# Patient Record
Sex: FEMALE | Race: WHITE | Employment: FULL TIME | URBAN - NONMETROPOLITAN AREA
[De-identification: names, ages, dates, MRNs, and addresses within clinical notes are randomized per-mention and may not be internally consistent; named-entity substitution may affect disease eponyms.]

---

## 2024-05-14 RX ORDER — LISINOPRIL 10 MG/1
10 TABLET ORAL DAILY
Qty: 30 TABLET | OUTPATIENT
Start: 2024-05-14

## 2024-05-22 ENCOUNTER — OFFICE VISIT (OUTPATIENT)
Dept: FAMILY MEDICINE CLINIC | Age: 44
End: 2024-05-22

## 2024-05-22 VITALS
BODY MASS INDEX: 36.19 KG/M2 | HEIGHT: 64 IN | DIASTOLIC BLOOD PRESSURE: 82 MMHG | WEIGHT: 212 LBS | HEART RATE: 89 BPM | OXYGEN SATURATION: 98 % | SYSTOLIC BLOOD PRESSURE: 122 MMHG

## 2024-05-22 DIAGNOSIS — R01.1 CARDIAC MURMUR: ICD-10-CM

## 2024-05-22 DIAGNOSIS — R01.1 NEWLY RECOGNIZED HEART MURMUR: ICD-10-CM

## 2024-05-22 DIAGNOSIS — Z13.6 ENCOUNTER FOR LIPID SCREENING FOR CARDIOVASCULAR DISEASE: ICD-10-CM

## 2024-05-22 DIAGNOSIS — I10 PRIMARY HYPERTENSION: ICD-10-CM

## 2024-05-22 DIAGNOSIS — Z13.220 ENCOUNTER FOR LIPID SCREENING FOR CARDIOVASCULAR DISEASE: ICD-10-CM

## 2024-05-22 DIAGNOSIS — R60.0 LOWER LEG EDEMA: Primary | ICD-10-CM

## 2024-05-22 DIAGNOSIS — R53.83 OTHER FATIGUE: ICD-10-CM

## 2024-05-22 DIAGNOSIS — G43.C0 PERIODIC HEADACHE SYNDROME, NOT INTRACTABLE: ICD-10-CM

## 2024-05-22 DIAGNOSIS — M54.42 ACUTE LEFT-SIDED LOW BACK PAIN WITH LEFT-SIDED SCIATICA: ICD-10-CM

## 2024-05-22 PROCEDURE — 99212 OFFICE O/P EST SF 10 MIN: CPT

## 2024-05-22 PROCEDURE — 3074F SYST BP LT 130 MM HG: CPT

## 2024-05-22 PROCEDURE — 93010 ELECTROCARDIOGRAM REPORT: CPT

## 2024-05-22 PROCEDURE — 93005 ELECTROCARDIOGRAM TRACING: CPT

## 2024-05-22 PROCEDURE — 3079F DIAST BP 80-89 MM HG: CPT

## 2024-05-22 PROCEDURE — 99204 OFFICE O/P NEW MOD 45 MIN: CPT

## 2024-05-22 RX ORDER — POTASSIUM CHLORIDE 750 MG/1
10 TABLET, FILM COATED, EXTENDED RELEASE ORAL DAILY
Qty: 60 TABLET | Refills: 0 | Status: SHIPPED | OUTPATIENT
Start: 2024-05-22

## 2024-05-22 RX ORDER — TOPIRAMATE 50 MG/1
TABLET, FILM COATED ORAL
COMMUNITY
Start: 2024-04-12 | End: 2024-05-22

## 2024-05-22 RX ORDER — TIZANIDINE 2 MG/1
2 TABLET ORAL EVERY 8 HOURS PRN
Qty: 40 TABLET | Refills: 0 | Status: SHIPPED | OUTPATIENT
Start: 2024-05-22

## 2024-05-22 RX ORDER — LISINOPRIL 10 MG/1
10 TABLET ORAL DAILY
COMMUNITY
Start: 2024-04-12 | End: 2024-05-22 | Stop reason: SDUPTHER

## 2024-05-22 RX ORDER — FUROSEMIDE 20 MG/1
TABLET ORAL
COMMUNITY
Start: 2024-04-12 | End: 2024-05-22 | Stop reason: SDUPTHER

## 2024-05-22 RX ORDER — TOPIRAMATE 100 MG/1
100 TABLET, FILM COATED ORAL 2 TIMES DAILY
Qty: 60 TABLET | Refills: 0 | Status: SHIPPED | OUTPATIENT
Start: 2024-05-22

## 2024-05-22 RX ORDER — POTASSIUM CHLORIDE 750 MG/1
TABLET, FILM COATED, EXTENDED RELEASE ORAL
COMMUNITY
Start: 2024-04-12 | End: 2024-05-22 | Stop reason: SDUPTHER

## 2024-05-22 RX ORDER — FUROSEMIDE 20 MG/1
20 TABLET ORAL DAILY
Qty: 60 TABLET | Refills: 0 | Status: SHIPPED | OUTPATIENT
Start: 2024-05-22

## 2024-05-22 RX ORDER — LISINOPRIL 10 MG/1
10 TABLET ORAL DAILY
Qty: 30 TABLET | Refills: 0 | Status: SHIPPED | OUTPATIENT
Start: 2024-05-22

## 2024-05-22 SDOH — ECONOMIC STABILITY: FOOD INSECURITY: WITHIN THE PAST 12 MONTHS, THE FOOD YOU BOUGHT JUST DIDN'T LAST AND YOU DIDN'T HAVE MONEY TO GET MORE.: NEVER TRUE

## 2024-05-22 SDOH — ECONOMIC STABILITY: FOOD INSECURITY: WITHIN THE PAST 12 MONTHS, YOU WORRIED THAT YOUR FOOD WOULD RUN OUT BEFORE YOU GOT MONEY TO BUY MORE.: NEVER TRUE

## 2024-05-22 SDOH — ECONOMIC STABILITY: HOUSING INSECURITY
IN THE LAST 12 MONTHS, WAS THERE A TIME WHEN YOU DID NOT HAVE A STEADY PLACE TO SLEEP OR SLEPT IN A SHELTER (INCLUDING NOW)?: NO

## 2024-05-22 SDOH — ECONOMIC STABILITY: INCOME INSECURITY: HOW HARD IS IT FOR YOU TO PAY FOR THE VERY BASICS LIKE FOOD, HOUSING, MEDICAL CARE, AND HEATING?: NOT HARD AT ALL

## 2024-05-22 ASSESSMENT — ENCOUNTER SYMPTOMS
SHORTNESS OF BREATH: 0
CHEST TIGHTNESS: 0
WHEEZING: 0
ABDOMINAL PAIN: 0
COLOR CHANGE: 0
BACK PAIN: 1
COUGH: 0

## 2024-05-22 ASSESSMENT — PATIENT HEALTH QUESTIONNAIRE - PHQ9
SUM OF ALL RESPONSES TO PHQ QUESTIONS 1-9: 0
SUM OF ALL RESPONSES TO PHQ QUESTIONS 1-9: 0
SUM OF ALL RESPONSES TO PHQ9 QUESTIONS 1 & 2: 0
SUM OF ALL RESPONSES TO PHQ QUESTIONS 1-9: 0
SUM OF ALL RESPONSES TO PHQ QUESTIONS 1-9: 0
2. FEELING DOWN, DEPRESSED OR HOPELESS: NOT AT ALL
1. LITTLE INTEREST OR PLEASURE IN DOING THINGS: NOT AT ALL

## 2024-05-22 NOTE — PROGRESS NOTES
[facility]  [address] [address]  Dept: [phone]    Date of Service:  5/22/2024    Jess Gonzalez is a 44 y.o. female who presents in office today with Self.    Chief Complaint   Patient presents with    New Patient     Pt is here to get established. Pt states she is having a lot of edema in both legs and feet. Pt states she was put on lasix and that seems to not be helping.         Diagnoses / Plan:   1. Lower leg edema  -     furosemide (LASIX) 20 MG tablet; Take 1 tablet by mouth daily, Disp-60 tablet, R-0Normal  -     potassium chloride (KLOR-CON) 10 MEQ extended release tablet; Take 1 tablet by mouth daily, Disp-60 tablet, R-0Normal  -Did have some dependency edema exacerbation due to being seated for extended times yesterday.  Will restart on Lasix as well as potassium, check CMP, office will call with results.  Will have her follow-up with cardiology to explore if this is linked to a heart problem as she is not taking any NSAIDs or other medications that would cause this.  Weight is possibly contributing factor and this is discussed with Kate.  Do suggest compression stockings on in the morning off in the evenings.  2. Primary hypertension  Assessment & Plan:   At goal, continue current medications and continue current treatment plan doing well on current medications, she had been on Lopressor for palpitations however denies this currently.  Orders:  -     lisinopril (PRINIVIL;ZESTRIL) 10 MG tablet; Take 1 tablet by mouth daily, Disp-30 tablet, R-0Normal  -     Comprehensive Metabolic Panel, Fasting; Future  -     CBC with Auto Differential; Future  -     Jonah Cote MD, Cardiology, [place]  -     EKG 12 lead; Future  3. Periodic headache syndrome, not intractable  -     topiramate (TOPAMAX) 100 MG tablet; Take 1 tablet by mouth 2 times daily, Disp-60 tablet, R-0Normal  -Controlled: Does have history of migraines, has not had migraine in quite some

## 2024-05-22 NOTE — ASSESSMENT & PLAN NOTE
At goal, continue current medications and continue current treatment plan doing well on current medications, she had been on Lopressor for palpitations however denies this currently.

## 2024-05-23 LAB
ALBUMIN/GLOBULIN RATIO: 1.5 G/DL
ALBUMIN: 4.1 G/DL (ref 3.5–5)
ALP BLD-CCNC: 81 UNITS/L (ref 38–126)
ALT SERPL-CCNC: 21 UNITS/L (ref 4–35)
ANION GAP SERPL CALCULATED.3IONS-SCNC: 6.5 MMOL/L (ref 3–11)
AST SERPL-CCNC: 22 UNITS/L (ref 14–36)
BASOPHILS ABSOLUTE: 0.06 (ref 0–0.3)
BASOPHILS RELATIVE PERCENT: 0.71 (ref 0–3)
BILIRUB SERPL-MCNC: 0.5 MG/DL (ref 0.2–1.3)
BUN BLDV-MCNC: 11 MG/DL (ref 7–17)
CALCIUM SERPL-MCNC: 9.2 MG/DL (ref 8.4–10.2)
CHLORIDE BLD-SCNC: 108 MMOL/L (ref 98–120)
CHOLESTEROL, TOTAL: 158 MG/DL (ref 50–200)
CHOLESTEROL/HDL RATIO: 2 RATIO (ref 0–4.5)
CO2: 22 MMOL/L (ref 22–31)
CREAT SERPL-MCNC: 0.5 MG/DL (ref 0.5–1)
EOSINOPHILS ABSOLUTE: 0.33 (ref 0–1.1)
EOSINOPHILS RELATIVE PERCENT: 3.65 (ref 0–10)
GFR, ESTIMATED: > 60
GLOBULIN: 2.7 G/DL
GLUCOSE: 86 MG/DL (ref 65–105)
HCT VFR BLD CALC: 41.1 % (ref 37–47)
HDLC SERPL-MCNC: 63 MG/DL (ref 36–68)
HEMOGLOBIN: 12.9 (ref 12–16)
LDL CHOLESTEROL: 84 MG/DL (ref 0–160)
LYMPHOCYTES ABSOLUTE: 2.93 (ref 1–5.5)
LYMPHOCYTES RELATIVE PERCENT: 32.78 (ref 20–51.1)
MCH RBC QN AUTO: 27.4 PG (ref 28.5–32.5)
MCHC RBC AUTO-ENTMCNC: 31.3 G/DL (ref 32–37)
MCV RBC AUTO: 87.6 FL (ref 80–94)
MONOCYTES ABSOLUTE: 0.58 (ref 0.1–1)
MONOCYTES RELATIVE PERCENT: 6.48 (ref 1.7–9.3)
NEUTROPHILS ABSOLUTE: 5.03 (ref 2–8.1)
NEUTROPHILS RELATIVE PERCENT: 56.37 (ref 42.2–75.2)
PDW BLD-RTO: 12.8 % (ref 8.5–15.5)
PLATELET # BLD: 264.5 THOU/MM3 (ref 130–400)
POTASSIUM SERPL-SCNC: 4 MMOL/L (ref 3.6–5)
RBC # BLD: 4.69 M/UL (ref 4.2–5.4)
SODIUM BLD-SCNC: 137 MMOL/L (ref 135–145)
TOTAL PROTEIN: 6.9 G/DL (ref 6.3–8.2)
TRIGL SERPL-MCNC: 55 MG/DL (ref 10–250)
TSH REFLEX FT4: 2.23 MIU/ML (ref 0.49–4.67)
VLDLC SERPL CALC-MCNC: 11 MG/DL (ref 0–50)
WBC # BLD: 8.9 THOU/ML3 (ref 4.8–10.8)

## 2024-06-12 DIAGNOSIS — M54.42 ACUTE LEFT-SIDED LOW BACK PAIN WITH LEFT-SIDED SCIATICA: ICD-10-CM

## 2024-06-12 NOTE — TELEPHONE ENCOUNTER
Jess Gonzalez is requesting a refill on the following medication(s):  Requested Prescriptions     Pending Prescriptions Disp Refills    tiZANidine (ZANAFLEX) 2 MG tablet [Pharmacy Med Name: TIZANIDINE HCL 2 MG TABLET] 40 tablet 0     Sig: take 1 tablet by mouth every 8 hours if needed for back pain       Last Visit Date (If Applicable):  5/22/2024    Next Visit Date:    Visit date not found

## 2024-06-13 RX ORDER — TIZANIDINE 2 MG/1
TABLET ORAL
Qty: 40 TABLET | Refills: 0 | Status: SHIPPED | OUTPATIENT
Start: 2024-06-13

## 2024-06-14 ENCOUNTER — OFFICE VISIT (OUTPATIENT)
Dept: CARDIOLOGY | Age: 44
End: 2024-06-14

## 2024-06-14 VITALS
OXYGEN SATURATION: 99 % | HEIGHT: 64 IN | HEART RATE: 75 BPM | BODY MASS INDEX: 35.75 KG/M2 | WEIGHT: 209.4 LBS | SYSTOLIC BLOOD PRESSURE: 138 MMHG | DIASTOLIC BLOOD PRESSURE: 66 MMHG

## 2024-06-14 DIAGNOSIS — I10 ESSENTIAL HYPERTENSION: ICD-10-CM

## 2024-06-14 DIAGNOSIS — I50.32 CHRONIC DIASTOLIC HEART FAILURE (HCC): ICD-10-CM

## 2024-06-14 DIAGNOSIS — R01.1 SYSTOLIC MURMUR: Primary | ICD-10-CM

## 2024-06-14 DIAGNOSIS — R60.0 LOWER EXTREMITY EDEMA: ICD-10-CM

## 2024-06-14 PROCEDURE — 3078F DIAST BP <80 MM HG: CPT | Performed by: INTERNAL MEDICINE

## 2024-06-14 PROCEDURE — 99204 OFFICE O/P NEW MOD 45 MIN: CPT | Performed by: INTERNAL MEDICINE

## 2024-06-14 PROCEDURE — 3075F SYST BP GE 130 - 139MM HG: CPT | Performed by: INTERNAL MEDICINE

## 2024-06-14 NOTE — PROGRESS NOTES
Cardiology Consultation/Follow Up.  [facility]    Jess Gonzalez  1980  [id number]    Today: 24    CC: Patient is here for new consult for murmur    HPI:   Jess Gonzalez is here for new consult for murmur.  States that she has a history of this.   Saw PCP, heard murmur, referred to cardiology.   No cp.   No sob.   No syncope.   Gets some LE edema occasionally.   On chronic lasix and KCL.   No edema today.   Some occasional palpitations.     Past Medical:  Past Medical History:   Diagnosis Date    Asthma 2014    Chest pain 2014    Fever 2014    Hypertension 2014    Nausea 2014    Palpitations 2014         Past Surgical:  Past Surgical History:   Procedure Laterality Date    BACK SURGERY       SECTION      CHOLECYSTECTOMY      HYSTERECTOMY (CERVIX STATUS UNKNOWN)  2013    LUMBAR SPINE SURGERY N/A 2016    decompressiom L4-5    TONSILLECTOMY           Family History:  History reviewed. No pertinent family history.    Social History:  Social History     Socioeconomic History    Marital status: Single     Spouse name: Not on file    Number of children: Not on file    Years of education: Not on file    Highest education level: Not on file   Occupational History    Not on file   Tobacco Use    Smoking status: Never    Smokeless tobacco: Never   Substance and Sexual Activity    Alcohol use: No    Drug use: No    Sexual activity: Not on file   Other Topics Concern    Not on file   Social History Narrative    Not on file     Social Determinants of Health     Financial Resource Strain: Low Risk  (2024)    Overall Financial Resource Strain (CARDIA)     Difficulty of Paying Living Expenses: Not hard at all   Food Insecurity: No Food Insecurity (2024)    Hunger Vital Sign     Worried About Running Out of Food in the Last Year: Never true     Ran Out of Food in the Last Year: Never true   Transportation Needs: Unknown (2024)    PRAPARE -

## 2024-06-21 ENCOUNTER — HOSPITAL ENCOUNTER (OUTPATIENT)
Age: 44
Discharge: HOME | End: 2024-06-21
Attending: INTERNAL MEDICINE

## 2024-06-21 ENCOUNTER — TELEPHONE (OUTPATIENT)
Dept: CARDIOLOGY | Age: 44
End: 2024-06-21

## 2024-06-21 VITALS — WEIGHT: 207.23 LBS | BODY MASS INDEX: 34.53 KG/M2 | HEIGHT: 65 IN

## 2024-06-21 DIAGNOSIS — R60.0 LOWER EXTREMITY EDEMA: ICD-10-CM

## 2024-06-21 DIAGNOSIS — I50.32 CHRONIC DIASTOLIC HEART FAILURE (HCC): ICD-10-CM

## 2024-06-21 DIAGNOSIS — R01.1 SYSTOLIC MURMUR: ICD-10-CM

## 2024-06-21 DIAGNOSIS — I10 ESSENTIAL HYPERTENSION: ICD-10-CM

## 2024-06-21 LAB
ECHO AO ROOT DIAM: 2.7 CM
ECHO AO ROOT INDEX: 1.34 CM/M2
ECHO AV AREA PEAK VELOCITY: 2.4 CM2
ECHO AV AREA/BSA PEAK VELOCITY: 1.2 CM2/M2
ECHO AV PEAK GRADIENT: 10 MMHG
ECHO AV PEAK VELOCITY: 1.6 M/S
ECHO AV VELOCITY RATIO: 0.81
ECHO BSA: 2.08 M2
ECHO EST RA PRESSURE: 3 MMHG
ECHO IVC PROX: 1.7 CM
ECHO LA AREA 2C: 19.8 CM2
ECHO LA AREA 4C: 17.8 CM2
ECHO LA DIAMETER INDEX: 1.54 CM/M2
ECHO LA DIAMETER: 3.1 CM
ECHO LA MAJOR AXIS: 5.2 CM
ECHO LA MINOR AXIS: 5.5 CM
ECHO LA TO AORTIC ROOT RATIO: 1.15
ECHO LA VOL BP: 53 ML (ref 22–52)
ECHO LA VOL MOD A2C: 57 ML (ref 22–52)
ECHO LA VOL MOD A4C: 46 ML (ref 22–52)
ECHO LA VOL/BSA BIPLANE: 26 ML/M2 (ref 16–34)
ECHO LA VOLUME INDEX MOD A2C: 28 ML/M2 (ref 16–34)
ECHO LA VOLUME INDEX MOD A4C: 23 ML/M2 (ref 16–34)
ECHO LV E' LATERAL VELOCITY: 11 CM/S
ECHO LV E' SEPTAL VELOCITY: 8 CM/S
ECHO LV EDV A2C: 101 ML
ECHO LV EDV A4C: 79 ML
ECHO LV EDV INDEX A4C: 39 ML/M2
ECHO LV EDV NDEX A2C: 50 ML/M2
ECHO LV EJECTION FRACTION A2C: 62 %
ECHO LV EJECTION FRACTION A4C: 60 %
ECHO LV EJECTION FRACTION BIPLANE: 60 % (ref 55–100)
ECHO LV ESV A2C: 38 ML
ECHO LV ESV A4C: 32 ML
ECHO LV ESV INDEX A2C: 19 ML/M2
ECHO LV ESV INDEX A4C: 16 ML/M2
ECHO LV FRACTIONAL SHORTENING: 42 % (ref 28–44)
ECHO LV INTERNAL DIMENSION DIASTOLE INDEX: 2.39 CM/M2
ECHO LV INTERNAL DIMENSION DIASTOLIC: 4.8 CM (ref 3.9–5.3)
ECHO LV INTERNAL DIMENSION SYSTOLIC INDEX: 1.39 CM/M2
ECHO LV INTERNAL DIMENSION SYSTOLIC: 2.8 CM
ECHO LV IVSD: 1.1 CM (ref 0.6–0.9)
ECHO LV MASS 2D: 181.9 G (ref 67–162)
ECHO LV MASS INDEX 2D: 90.5 G/M2 (ref 43–95)
ECHO LV POSTERIOR WALL DIASTOLIC: 1 CM (ref 0.6–0.9)
ECHO LV RELATIVE WALL THICKNESS RATIO: 0.42
ECHO LVOT AREA: 2.8 CM2
ECHO LVOT DIAM: 1.9 CM
ECHO LVOT MEAN GRADIENT: 4 MMHG
ECHO LVOT PEAK GRADIENT: 7 MMHG
ECHO LVOT PEAK VELOCITY: 1.3 M/S
ECHO LVOT STROKE VOLUME INDEX: 43.8 ML/M2
ECHO LVOT SV: 88.1 ML
ECHO LVOT VTI: 31.1 CM
ECHO MV A VELOCITY: 0.69 M/S
ECHO MV E DECELERATION TIME (DT): 161 MS
ECHO MV E VELOCITY: 0.86 M/S
ECHO MV E/A RATIO: 1.25
ECHO MV E/E' LATERAL: 7.82
ECHO MV E/E' RATIO (AVERAGED): 9.28
ECHO MV E/E' SEPTAL: 10.75
ECHO RIGHT VENTRICULAR SYSTOLIC PRESSURE (RVSP): 22 MMHG
ECHO RV TAPSE: 2.5 CM (ref 1.7–?)
ECHO TV REGURGITANT MAX VELOCITY: 2.19 M/S
ECHO TV REGURGITANT PEAK GRADIENT: 19 MMHG

## 2024-06-21 PROCEDURE — 93306 TTE W/DOPPLER COMPLETE: CPT

## 2024-06-21 PROCEDURE — 93306 TTE W/DOPPLER COMPLETE: CPT | Performed by: INTERNAL MEDICINE

## 2024-06-21 NOTE — TELEPHONE ENCOUNTER
Interpretation Summary         Left Ventricle: Normal left ventricular systolic function with a visually estimated EF of 55 - 60%. Left ventricle size is normal. Normal wall thickness. Normal wall motion. Normal diastolic function.    No significant valvular regurgitation or stenosis seen    Image quality is adequate.     Echo Findings    Left Ventricle Normal left ventricular systolic function with a visually estimated EF of 55 - 60%. Left ventricle size is normal. Normal wall thickness. Normal wall motion. Normal diastolic function.   Left Atrium Left atrium size is normal.   Right Ventricle Right ventricle size is normal. Normal systolic function.   Right Atrium Right atrium size is normal.   Aortic Valve Valve structure is normal. No regurgitation. No stenosis.   Mitral Valve Valve structure is normal. Trace regurgitation. No stenosis noted.   Tricuspid Valve Valve structure is normal. Trace-mild regurgitation. No stenosis noted. Normal RVSP.   Pulmonic Valve The pulmonic valve was not well visualized. Valve structure is normal. No regurgitation. No stenosis noted.   Aorta Normal sized aortic root and ascending aorta.   IVC/Hepatic Veins IVC diameter is less than or equal to 21 mm and decreases greater than 50% during inspiration; therefore the estimated right atrial pressure is normal (~3 mmHg). IVC size is normal.   Pericardium No pericardial effusion.

## 2024-06-26 DIAGNOSIS — G43.C0 PERIODIC HEADACHE SYNDROME, NOT INTRACTABLE: ICD-10-CM

## 2024-06-26 RX ORDER — TOPIRAMATE 100 MG/1
100 TABLET, FILM COATED ORAL 2 TIMES DAILY
Qty: 60 TABLET | Refills: 0 | OUTPATIENT
Start: 2024-06-26

## 2024-06-26 NOTE — TELEPHONE ENCOUNTER
Please refill medication since PCP is out of the office until 7/1/24. Thank you       Jess Gonzalez is requesting a refill on the following medication(s):  Requested Prescriptions     Pending Prescriptions Disp Refills    topiramate (TOPAMAX) 100 MG tablet [Pharmacy Med Name: TOPIRAMATE 100 MG TABLET] 60 tablet 0     Sig: take 1 tablet by mouth twice a day       Last Visit Date (If Applicable):  5/22/2024    Next Visit Date:    Visit date not found

## 2024-07-22 ENCOUNTER — OFFICE VISIT (OUTPATIENT)
Dept: FAMILY MEDICINE CLINIC | Age: 44
End: 2024-07-22

## 2024-07-22 VITALS
WEIGHT: 212 LBS | BODY MASS INDEX: 35.32 KG/M2 | SYSTOLIC BLOOD PRESSURE: 142 MMHG | DIASTOLIC BLOOD PRESSURE: 82 MMHG | OXYGEN SATURATION: 98 % | HEART RATE: 72 BPM

## 2024-07-22 DIAGNOSIS — Z12.31 SCREENING MAMMOGRAM FOR BREAST CANCER: Primary | ICD-10-CM

## 2024-07-22 DIAGNOSIS — M25.531 RIGHT WRIST PAIN: ICD-10-CM

## 2024-07-22 PROCEDURE — 3079F DIAST BP 80-89 MM HG: CPT

## 2024-07-22 PROCEDURE — 3077F SYST BP >= 140 MM HG: CPT

## 2024-07-22 PROCEDURE — PBSHW PBB SHADOW CHARGE

## 2024-07-22 PROCEDURE — 99213 OFFICE O/P EST LOW 20 MIN: CPT

## 2024-07-22 PROCEDURE — 99212 OFFICE O/P EST SF 10 MIN: CPT

## 2024-07-22 RX ORDER — KETOROLAC TROMETHAMINE 15 MG/ML
15 INJECTION, SOLUTION INTRAMUSCULAR; INTRAVENOUS ONCE
Status: COMPLETED | OUTPATIENT
Start: 2024-07-22 | End: 2024-07-22

## 2024-07-22 RX ADMIN — KETOROLAC TROMETHAMINE 15 MG: 15 INJECTION, SOLUTION INTRAMUSCULAR; INTRAVENOUS at 15:39

## 2024-07-22 NOTE — PROGRESS NOTES
[facility]  [address] [address]  Dept: [phone]    Date of Service:  7/22/2024    Jess Gonzalez is a 44 y.o. female who presents in office today with Self.    Chief Complaint   Patient presents with    Wrist Pain     Patient is here today for right wrist pain that started on Saturday. She is unsure if she injured it while moving a family member.         Diagnoses / Plan:   1. Screening mammogram for breast cancer  -     [facility] DIGITAL SCREEN W OR WO CAD BILATERAL; Future  2. Right wrist pain  -     XR WRIST RIGHT (MIN 3 VIEWS); Future   -Pain diffusely throughout the wrist worse with any type of flexion extension.  No physical exam abnormalities we will get x-ray Toradol injection in office today continue ice, splinting as discussed will Ace wrap in office.  Have her hold off on work for this week, let office know if no improvement of symptoms.      Encouraged symptomatic treatment, rest, increase oral fluid intake.  Follow-up for worsening or persistent symptoms.  Patient verbalizes understanding regarding plan of care and all questions answered.    Return if symptoms worsen or fail to improve.     Subjective:   History of Present Illness:  -right wrist pain worse since saturday.  Gradual onset, no snapping, or actual trauma noted.  She was helping lift heavy boxes.  She is having pain nowhere else other than this right wrist.  The wrist has no bony deformations, she has not had pain here per se before other than just aches and pains.      Current Outpatient Medications   Medication Sig Dispense Refill    tiZANidine (ZANAFLEX) 2 MG tablet take 1 tablet by mouth every 8 hours if needed for back pain 40 tablet 0    furosemide (LASIX) 20 MG tablet Take 1 tablet by mouth daily 60 tablet 0    lisinopril (PRINIVIL;ZESTRIL) 10 MG tablet Take 1 tablet by mouth daily 30 tablet 0    potassium chloride (KLOR-CON) 10 MEQ extended release tablet Take 1 tablet by mouth daily 60

## 2024-07-23 ENCOUNTER — TELEPHONE (OUTPATIENT)
Dept: FAMILY MEDICINE CLINIC | Age: 44
End: 2024-07-23

## 2024-07-23 NOTE — TELEPHONE ENCOUNTER
Patient had her labs done a month ago. She would like to know if there is anything she can try for weight loss?

## 2024-07-24 ASSESSMENT — ENCOUNTER SYMPTOMS: GASTROINTESTINAL NEGATIVE: 1

## 2024-08-08 DIAGNOSIS — I10 PRIMARY HYPERTENSION: ICD-10-CM

## 2024-08-08 DIAGNOSIS — E66.09 CLASS 2 OBESITY DUE TO EXCESS CALORIES WITHOUT SERIOUS COMORBIDITY WITH BODY MASS INDEX (BMI) OF 35.0 TO 35.9 IN ADULT: Primary | ICD-10-CM

## 2024-08-08 DIAGNOSIS — R60.0 LOWER LEG EDEMA: ICD-10-CM

## 2024-08-08 RX ORDER — LISINOPRIL 10 MG/1
10 TABLET ORAL DAILY
Qty: 30 TABLET | Refills: 0 | Status: SHIPPED | OUTPATIENT
Start: 2024-08-08

## 2024-08-08 RX ORDER — SEMAGLUTIDE 0.25 MG/.5ML
0.25 INJECTION, SOLUTION SUBCUTANEOUS
Qty: 2 ML | Refills: 0 | Status: SHIPPED | OUTPATIENT
Start: 2024-08-08

## 2024-08-08 RX ORDER — POTASSIUM CHLORIDE 750 MG/1
10 TABLET, FILM COATED, EXTENDED RELEASE ORAL DAILY
Qty: 60 TABLET | Refills: 0 | Status: SHIPPED | OUTPATIENT
Start: 2024-08-08

## 2024-08-08 RX ORDER — FUROSEMIDE 20 MG/1
20 TABLET ORAL DAILY
Qty: 60 TABLET | Refills: 0 | Status: SHIPPED | OUTPATIENT
Start: 2024-08-08

## 2024-08-08 NOTE — TELEPHONE ENCOUNTER
Patient would like to try wegovy for weight loss      Jess Gonzalez is requesting a refill on the following medication(s):  Requested Prescriptions     Pending Prescriptions Disp Refills    furosemide (LASIX) 20 MG tablet 60 tablet 0     Sig: Take 1 tablet by mouth daily    lisinopril (PRINIVIL;ZESTRIL) 10 MG tablet 30 tablet 0     Sig: Take 1 tablet by mouth daily    potassium chloride (KLOR-CON) 10 MEQ extended release tablet 60 tablet 0     Sig: Take 1 tablet by mouth daily       Last Visit Date (If Applicable):  7/22/2024    Next Visit Date:    Visit date not found

## 2024-08-16 ENCOUNTER — PATIENT MESSAGE (OUTPATIENT)
Dept: FAMILY MEDICINE CLINIC | Age: 44
End: 2024-08-16

## 2024-08-16 DIAGNOSIS — I10 PRIMARY HYPERTENSION: ICD-10-CM

## 2024-08-16 DIAGNOSIS — E66.09 CLASS 2 OBESITY DUE TO EXCESS CALORIES WITHOUT SERIOUS COMORBIDITY WITH BODY MASS INDEX (BMI) OF 35.0 TO 35.9 IN ADULT: Primary | ICD-10-CM

## 2024-08-16 DIAGNOSIS — E66.01 CLASS 2 SEVERE OBESITY DUE TO EXCESS CALORIES WITH SERIOUS COMORBIDITY AND BODY MASS INDEX (BMI) OF 36.0 TO 36.9 IN ADULT (HCC): ICD-10-CM

## 2024-08-16 DIAGNOSIS — M54.42 ACUTE LEFT-SIDED LOW BACK PAIN WITH LEFT-SIDED SCIATICA: ICD-10-CM

## 2024-08-20 ENCOUNTER — OFFICE VISIT (OUTPATIENT)
Dept: FAMILY MEDICINE CLINIC | Age: 44
End: 2024-08-20
Payer: COMMERCIAL

## 2024-08-20 VITALS
SYSTOLIC BLOOD PRESSURE: 122 MMHG | BODY MASS INDEX: 36.19 KG/M2 | WEIGHT: 212 LBS | OXYGEN SATURATION: 98 % | HEART RATE: 91 BPM | DIASTOLIC BLOOD PRESSURE: 84 MMHG | HEIGHT: 64 IN

## 2024-08-20 DIAGNOSIS — R82.90 ABNORMAL URINE ODOR: ICD-10-CM

## 2024-08-20 DIAGNOSIS — M54.50 LOW BACK PAIN, UNSPECIFIED BACK PAIN LATERALITY, UNSPECIFIED CHRONICITY, UNSPECIFIED WHETHER SCIATICA PRESENT: Primary | ICD-10-CM

## 2024-08-20 DIAGNOSIS — Z87.442 H/O RENAL CALCULI: ICD-10-CM

## 2024-08-20 DIAGNOSIS — I10 PRIMARY HYPERTENSION: ICD-10-CM

## 2024-08-20 LAB
BILIRUBIN, POC: NORMAL
BLOOD URINE, POC: NORMAL
CLARITY, POC: CLEAR
COLOR, POC: YELLOW
GLUCOSE URINE, POC: NORMAL
KETONES, POC: NORMAL
LEUKOCYTE EST, POC: NORMAL
NITRITE, POC: NORMAL
PH, POC: 5.5
PROTEIN, POC: NORMAL
SPECIFIC GRAVITY, POC: 1.03
UROBILINOGEN, POC: 0.2

## 2024-08-20 PROCEDURE — G8417 CALC BMI ABV UP PARAM F/U: HCPCS | Performed by: FAMILY MEDICINE

## 2024-08-20 PROCEDURE — G8427 DOCREV CUR MEDS BY ELIG CLIN: HCPCS | Performed by: FAMILY MEDICINE

## 2024-08-20 PROCEDURE — 3074F SYST BP LT 130 MM HG: CPT | Performed by: FAMILY MEDICINE

## 2024-08-20 PROCEDURE — 99214 OFFICE O/P EST MOD 30 MIN: CPT | Performed by: FAMILY MEDICINE

## 2024-08-20 PROCEDURE — 1036F TOBACCO NON-USER: CPT | Performed by: FAMILY MEDICINE

## 2024-08-20 PROCEDURE — 3079F DIAST BP 80-89 MM HG: CPT | Performed by: FAMILY MEDICINE

## 2024-08-20 PROCEDURE — 81002 URINALYSIS NONAUTO W/O SCOPE: CPT | Performed by: FAMILY MEDICINE

## 2024-08-20 PROCEDURE — PBSHW POCT URINALYSIS DIPSTICK: Performed by: FAMILY MEDICINE

## 2024-08-20 PROCEDURE — 99211 OFF/OP EST MAY X REQ PHY/QHP: CPT | Performed by: FAMILY MEDICINE

## 2024-08-20 RX ORDER — TIZANIDINE 2 MG/1
2 TABLET ORAL 3 TIMES DAILY PRN
Qty: 30 TABLET | Refills: 0 | Status: SHIPPED | OUTPATIENT
Start: 2024-08-20 | End: 2024-08-30

## 2024-08-20 RX ORDER — MELOXICAM 15 MG/1
15 TABLET ORAL DAILY
Qty: 15 TABLET | Refills: 0 | Status: SHIPPED | OUTPATIENT
Start: 2024-08-20

## 2024-08-20 NOTE — PROGRESS NOTES
HPI:  Patient comes in today for   Chief Complaint   Patient presents with    Back Pain     Pt is here for back pain and odor to urine. Pt states the symptoms started Friday off and on.    Here with c/o Pain in low back for 4-5 days non radiating initially was on left side and now on right side ,denies any dysuria,frequency or hematuria. Urine has been dark.No nausea or vomiting no fever or chills.Has h/o kidney stones. Has had prior back surgery.h/o HTN is on lisinopril     HISTORY:  Past Medical History:   Diagnosis Date    Asthma 2014    Chest pain 2014    Fever 2014    Hypertension 2014    Nausea 2014    Palpitations 2014       Past Surgical History:   Procedure Laterality Date    BACK SURGERY       SECTION      CHOLECYSTECTOMY      HYSTERECTOMY (CERVIX STATUS UNKNOWN)  2013    LUMBAR SPINE SURGERY N/A 2016    decompressiom L4-5    TONSILLECTOMY          No family history on file.    Social History     Socioeconomic History    Marital status: Single     Spouse name: Not on file    Number of children: Not on file    Years of education: Not on file    Highest education level: Not on file   Occupational History    Not on file   Tobacco Use    Smoking status: Never    Smokeless tobacco: Never   Substance and Sexual Activity    Alcohol use: No    Drug use: No    Sexual activity: Not on file   Other Topics Concern    Not on file   Social History Narrative    Not on file     Social Determinants of Health     Financial Resource Strain: Low Risk  (2024)    Overall Financial Resource Strain (CARDIA)     Difficulty of Paying Living Expenses: Not hard at all   Food Insecurity: No Food Insecurity (2024)    Hunger Vital Sign     Worried About Running Out of Food in the Last Year: Never true     Ran Out of Food in the Last Year: Never true   Transportation Needs: Unknown (2024)    PRAPARE - Transportation     Lack of Transportation (Medical): Not on file     Lack

## 2024-08-23 RX ORDER — TIRZEPATIDE 2.5 MG/.5ML
2.5 INJECTION, SOLUTION SUBCUTANEOUS WEEKLY
Qty: 2 ML | Refills: 0 | Status: SHIPPED | OUTPATIENT
Start: 2024-08-23

## 2024-08-27 RX ORDER — TIZANIDINE 2 MG/1
TABLET ORAL
Qty: 30 TABLET | Refills: 0 | OUTPATIENT
Start: 2024-08-27

## 2024-09-03 RX ORDER — MELOXICAM 15 MG/1
15 TABLET ORAL DAILY
Qty: 90 TABLET | Refills: 0 | Status: SHIPPED | OUTPATIENT
Start: 2024-09-03

## 2024-09-03 NOTE — TELEPHONE ENCOUNTER
Jess Gonzalez is requesting a refill on the following medication(s):  Requested Prescriptions     Pending Prescriptions Disp Refills    meloxicam (MOBIC) 15 MG tablet [Pharmacy Med Name: MELOXICAM 15MG TABLETS] 15 tablet 0     Sig: TAKE 1 TABLET BY MOUTH DAILY       Last Visit Date (If Applicable):  8/20/2024    Next Visit Date:    Visit date not found

## 2024-09-06 DIAGNOSIS — I10 PRIMARY HYPERTENSION: ICD-10-CM

## 2024-09-06 DIAGNOSIS — G43.C0 PERIODIC HEADACHE SYNDROME, NOT INTRACTABLE: ICD-10-CM

## 2024-09-06 DIAGNOSIS — R60.0 LOWER LEG EDEMA: ICD-10-CM

## 2024-09-09 RX ORDER — TOPIRAMATE 100 MG/1
50 TABLET, FILM COATED ORAL 2 TIMES DAILY
Qty: 90 TABLET | Refills: 1 | Status: SHIPPED | OUTPATIENT
Start: 2024-09-09 | End: 2025-03-08

## 2024-09-09 RX ORDER — LISINOPRIL 10 MG/1
10 TABLET ORAL DAILY
Qty: 30 TABLET | Refills: 0 | Status: SHIPPED | OUTPATIENT
Start: 2024-09-09

## 2024-09-09 RX ORDER — FUROSEMIDE 20 MG
20 TABLET ORAL DAILY
Qty: 60 TABLET | Refills: 0 | Status: SHIPPED | OUTPATIENT
Start: 2024-09-09

## 2024-09-12 DIAGNOSIS — I10 PRIMARY HYPERTENSION: ICD-10-CM

## 2024-09-12 RX ORDER — LISINOPRIL 10 MG/1
10 TABLET ORAL DAILY
Qty: 30 TABLET | Refills: 0 | OUTPATIENT
Start: 2024-09-12

## 2024-09-23 ENCOUNTER — OFFICE VISIT (OUTPATIENT)
Dept: FAMILY MEDICINE CLINIC | Age: 44
End: 2024-09-23
Payer: COMMERCIAL

## 2024-09-23 VITALS
DIASTOLIC BLOOD PRESSURE: 92 MMHG | SYSTOLIC BLOOD PRESSURE: 150 MMHG | HEIGHT: 64 IN | BODY MASS INDEX: 36.88 KG/M2 | HEART RATE: 78 BPM | OXYGEN SATURATION: 99 % | WEIGHT: 216 LBS

## 2024-09-23 DIAGNOSIS — R60.0 LOWER LEG EDEMA: Primary | ICD-10-CM

## 2024-09-23 PROCEDURE — 3080F DIAST BP >= 90 MM HG: CPT

## 2024-09-23 PROCEDURE — 99213 OFFICE O/P EST LOW 20 MIN: CPT

## 2024-09-23 PROCEDURE — G8417 CALC BMI ABV UP PARAM F/U: HCPCS

## 2024-09-23 PROCEDURE — 1036F TOBACCO NON-USER: CPT

## 2024-09-23 PROCEDURE — 3077F SYST BP >= 140 MM HG: CPT

## 2024-09-23 PROCEDURE — G8427 DOCREV CUR MEDS BY ELIG CLIN: HCPCS

## 2024-09-23 ASSESSMENT — ENCOUNTER SYMPTOMS
SHORTNESS OF BREATH: 0
COLOR CHANGE: 0
ABDOMINAL PAIN: 0
COUGH: 0

## 2024-10-10 DIAGNOSIS — R60.0 LOWER LEG EDEMA: ICD-10-CM

## 2024-10-10 DIAGNOSIS — I10 PRIMARY HYPERTENSION: ICD-10-CM

## 2024-10-10 RX ORDER — POTASSIUM CHLORIDE 750 MG/1
10 TABLET, EXTENDED RELEASE ORAL DAILY
Qty: 60 TABLET | Refills: 0 | Status: SHIPPED | OUTPATIENT
Start: 2024-10-10

## 2024-10-10 RX ORDER — LISINOPRIL 10 MG/1
10 TABLET ORAL DAILY
Qty: 30 TABLET | Refills: 0 | Status: SHIPPED | OUTPATIENT
Start: 2024-10-10

## 2024-10-10 NOTE — TELEPHONE ENCOUNTER
Jess Gonzalez is requesting a refill on the following medication(s):  Requested Prescriptions     Pending Prescriptions Disp Refills    lisinopril (PRINIVIL;ZESTRIL) 10 MG tablet [Pharmacy Med Name: LISINOPRIL 10MG TABLETS] 30 tablet 0     Sig: TAKE 1 TABLET BY MOUTH DAILY    potassium chloride (KLOR-CON) 10 MEQ extended release tablet [Pharmacy Med Name: POTASSIUM CL 10MEQ ER TABLETS] 60 tablet 0     Sig: TAKE 1 TABLET BY MOUTH DAILY       Last Visit Date (If Applicable):  9/23/2024    Next Visit Date:    Visit date not found

## 2024-10-11 ENCOUNTER — OFFICE VISIT (OUTPATIENT)
Dept: FAMILY MEDICINE CLINIC | Age: 44
End: 2024-10-11
Payer: COMMERCIAL

## 2024-10-11 VITALS
TEMPERATURE: 98.4 F | BODY MASS INDEX: 35.68 KG/M2 | HEIGHT: 64 IN | DIASTOLIC BLOOD PRESSURE: 80 MMHG | HEART RATE: 83 BPM | WEIGHT: 209 LBS | SYSTOLIC BLOOD PRESSURE: 115 MMHG | OXYGEN SATURATION: 98 %

## 2024-10-11 DIAGNOSIS — J01.10 ACUTE NON-RECURRENT FRONTAL SINUSITIS: Primary | ICD-10-CM

## 2024-10-11 DIAGNOSIS — U09.9 POST COVID-19 CONDITION, UNSPECIFIED: ICD-10-CM

## 2024-10-11 PROCEDURE — G8484 FLU IMMUNIZE NO ADMIN: HCPCS | Performed by: FAMILY MEDICINE

## 2024-10-11 PROCEDURE — 3074F SYST BP LT 130 MM HG: CPT | Performed by: FAMILY MEDICINE

## 2024-10-11 PROCEDURE — G8417 CALC BMI ABV UP PARAM F/U: HCPCS | Performed by: FAMILY MEDICINE

## 2024-10-11 PROCEDURE — 3079F DIAST BP 80-89 MM HG: CPT | Performed by: FAMILY MEDICINE

## 2024-10-11 PROCEDURE — 1036F TOBACCO NON-USER: CPT | Performed by: FAMILY MEDICINE

## 2024-10-11 PROCEDURE — G8427 DOCREV CUR MEDS BY ELIG CLIN: HCPCS | Performed by: FAMILY MEDICINE

## 2024-10-11 PROCEDURE — 99213 OFFICE O/P EST LOW 20 MIN: CPT | Performed by: FAMILY MEDICINE

## 2024-10-11 RX ORDER — AZITHROMYCIN 250 MG/1
TABLET, FILM COATED ORAL
Qty: 6 TABLET | Refills: 0 | Status: SHIPPED | OUTPATIENT
Start: 2024-10-11 | End: 2024-10-21

## 2024-10-11 NOTE — PROGRESS NOTES
HPI:  Patient comes in today for   Chief Complaint   Patient presents with    Fatigue     Pt is here for congestion, head congestion, fatigue. Pt states she tested positive for covid 2 weeks ago and can't get the symptoms away.     Congestion   Here withn c/o sinus congestion with post nasal drainage had tested positive for covid 2 weeks ago still has some fatigue ,no shortness of breath or fever,still ahs a mild cough,no chest pain .     REVIEW OF SYSTEMS:  Cardio: No chest pain, palpitations, FERRER, edema, PND  Pulmonary: Has mild cough, no hemoptysis, no SOB  GI: No nausea, vomiting, dysphagia, odynophagia, diarrhea,constipation  : No dysuria, hematuria, urgency, incontinence  Past Medical History:   Diagnosis Date    Asthma 6/18/2014    Chest pain 6/18/2014    Fever 6/18/2014    Hypertension 6/18/2014    Nausea 6/18/2014    Palpitations 6/18/2014       Current Outpatient Medications   Medication Sig Dispense Refill    lisinopril (PRINIVIL;ZESTRIL) 10 MG tablet TAKE 1 TABLET BY MOUTH DAILY 30 tablet 0    potassium chloride (KLOR-CON) 10 MEQ extended release tablet TAKE 1 TABLET BY MOUTH DAILY 60 tablet 0    topiramate (TOPAMAX) 100 MG tablet Take 0.5 tablets by mouth 2 times daily 90 tablet 1    furosemide (LASIX) 20 MG tablet Take 1 tablet by mouth daily 60 tablet 0    meloxicam (MOBIC) 15 MG tablet TAKE 1 TABLET BY MOUTH DAILY 90 tablet 0    Tirzepatide-Weight Management (ZEPBOUND) 2.5 MG/0.5ML SOAJ Inject 2.5 mg into the skin once a week (Patient not taking: Reported on 9/23/2024) 2 mL 0    tiZANidine (ZANAFLEX) 2 MG tablet take 1 tablet by mouth every 8 hours if needed for back pain 40 tablet 0    Fexofenadine-Pseudoephedrine (ALLEGRA-D 24 HOUR PO) Take 30 mg by mouth. (Patient not taking: Reported on 6/14/2024)       No current facility-administered medications for this visit.     No Known Allergies    PHYSICAL EXAM:  VS:  /80 (Site: Right Upper Arm, Position: Sitting, Cuff Size: Large Adult)   Pulse

## 2024-11-26 DIAGNOSIS — I10 PRIMARY HYPERTENSION: ICD-10-CM

## 2024-11-26 RX ORDER — LISINOPRIL 10 MG/1
10 TABLET ORAL DAILY
Qty: 90 TABLET | Refills: 1 | Status: SHIPPED | OUTPATIENT
Start: 2024-11-26

## 2024-11-26 NOTE — TELEPHONE ENCOUNTER
Jess Gonzalez is requesting a refill on the following medication(s):  Requested Prescriptions     Pending Prescriptions Disp Refills    lisinopril (PRINIVIL;ZESTRIL) 10 MG tablet [Pharmacy Med Name: LISINOPRIL 10MG TABLETS] 30 tablet 0     Sig: TAKE 1 TABLET BY MOUTH DAILY       Last Visit Date (If Applicable):  10/11/2024    Next Visit Date:    Visit date not found

## 2024-12-04 ENCOUNTER — OFFICE VISIT (OUTPATIENT)
Dept: FAMILY MEDICINE CLINIC | Age: 44
End: 2024-12-04

## 2024-12-04 VITALS
BODY MASS INDEX: 35.85 KG/M2 | DIASTOLIC BLOOD PRESSURE: 90 MMHG | SYSTOLIC BLOOD PRESSURE: 140 MMHG | HEART RATE: 86 BPM | HEIGHT: 64 IN | WEIGHT: 210 LBS | OXYGEN SATURATION: 98 %

## 2024-12-04 DIAGNOSIS — E66.812 CLASS 2 OBESITY DUE TO EXCESS CALORIES WITHOUT SERIOUS COMORBIDITY WITH BODY MASS INDEX (BMI) OF 35.0 TO 35.9 IN ADULT: ICD-10-CM

## 2024-12-04 DIAGNOSIS — E66.09 CLASS 2 OBESITY DUE TO EXCESS CALORIES WITHOUT SERIOUS COMORBIDITY WITH BODY MASS INDEX (BMI) OF 35.0 TO 35.9 IN ADULT: ICD-10-CM

## 2024-12-04 DIAGNOSIS — M54.50 LOW BACK PAIN, UNSPECIFIED BACK PAIN LATERALITY, UNSPECIFIED CHRONICITY, UNSPECIFIED WHETHER SCIATICA PRESENT: Primary | ICD-10-CM

## 2024-12-04 DIAGNOSIS — I10 PRIMARY HYPERTENSION: ICD-10-CM

## 2024-12-04 DIAGNOSIS — M70.61 TROCHANTERIC BURSITIS OF RIGHT HIP: ICD-10-CM

## 2024-12-04 PROCEDURE — 99211 OFF/OP EST MAY X REQ PHY/QHP: CPT | Performed by: FAMILY MEDICINE

## 2024-12-04 PROCEDURE — 3080F DIAST BP >= 90 MM HG: CPT | Performed by: FAMILY MEDICINE

## 2024-12-04 PROCEDURE — 99214 OFFICE O/P EST MOD 30 MIN: CPT | Performed by: FAMILY MEDICINE

## 2024-12-04 PROCEDURE — 3077F SYST BP >= 140 MM HG: CPT | Performed by: FAMILY MEDICINE

## 2024-12-04 RX ORDER — TIZANIDINE 2 MG/1
2 TABLET ORAL EVERY 8 HOURS PRN
Qty: 30 TABLET | Refills: 0 | Status: SHIPPED | OUTPATIENT
Start: 2024-12-04

## 2024-12-04 NOTE — PROGRESS NOTES
HPI:  Patient comes in today for   Chief Complaint   Patient presents with    Back Pain     Pt is here for for back pain int he lower back. Pt has had back surgery in 2016. Pt states it was hard to walk.    Here with c/o Pain in low back more on right side of the hip has  had prior back surgery for lumbar disc herniation L4-5 with left side pain in past ,pain is non radiating.Denies any dysuria,frequency or hematuria.Is S/P hysterectomy .h/o HTN is on lisinopril BP is slightly up today.    HISTORY:  Past Medical History:   Diagnosis Date    Asthma 2014    Chest pain 2014    Fever 2014    Hypertension 2014    Nausea 2014    Palpitations 2014       Past Surgical History:   Procedure Laterality Date    BACK SURGERY       SECTION      CHOLECYSTECTOMY      HYSTERECTOMY (CERVIX STATUS UNKNOWN)  2013    LUMBAR SPINE SURGERY N/A 2016    decompressiom L4-5    TONSILLECTOMY          No family history on file.    Social History     Socioeconomic History    Marital status:      Spouse name: Not on file    Number of children: Not on file    Years of education: Not on file    Highest education level: Not on file   Occupational History    Not on file   Tobacco Use    Smoking status: Never    Smokeless tobacco: Never   Substance and Sexual Activity    Alcohol use: No    Drug use: No    Sexual activity: Not on file   Other Topics Concern    Not on file   Social History Narrative    Not on file     Social Determinants of Health     Financial Resource Strain: Low Risk  (2024)    Overall Financial Resource Strain (CARDIA)     Difficulty of Paying Living Expenses: Not hard at all   Food Insecurity: No Food Insecurity (2024)    Hunger Vital Sign     Worried About Running Out of Food in the Last Year: Never true     Ran Out of Food in the Last Year: Never true   Transportation Needs: Unknown (2024)    PRAPARE - Transportation     Lack of Transportation (Medical): Not

## 2024-12-18 RX ORDER — TIZANIDINE 2 MG/1
TABLET ORAL
Qty: 30 TABLET | Refills: 0 | Status: SHIPPED | OUTPATIENT
Start: 2024-12-18

## 2024-12-18 NOTE — TELEPHONE ENCOUNTER
Jess Gonzalez is requesting a refill on the following medication(s):  Requested Prescriptions     Pending Prescriptions Disp Refills    tiZANidine (ZANAFLEX) 2 MG tablet [Pharmacy Med Name: TIZANIDINE 2MG TABLETS] 30 tablet 0     Sig: TAKE 1 TABLET BY MOUTH EVERY 8 HOURS AS NEEDED FOR BACK PAIN       Last Visit Date (If Applicable):  12/4/2024    Next Visit Date:    12/17/2024

## 2024-12-24 DIAGNOSIS — R60.0 LOWER LEG EDEMA: ICD-10-CM

## 2024-12-27 NOTE — TELEPHONE ENCOUNTER
Jess Gonzalez is requesting a refill on the following medication(s):  Requested Prescriptions     Pending Prescriptions Disp Refills    potassium chloride (KLOR-CON) 10 MEQ extended release tablet [Pharmacy Med Name: POTASSIUM CL 10MEQ ER TABLETS] 60 tablet 0     Sig: TAKE 1 TABLET BY MOUTH DAILY    furosemide (LASIX) 20 MG tablet [Pharmacy Med Name: FUROSEMIDE 20MG TABLETS] 60 tablet 0     Sig: TAKE 1 TABLET BY MOUTH DAILY    meloxicam (MOBIC) 15 MG tablet [Pharmacy Med Name: MELOXICAM 15MG TABLETS] 90 tablet 0     Sig: TAKE 1 TABLET BY MOUTH DAILY       Last Visit Date (If Applicable):  12/4/2024    Next Visit Date:    Visit date not found

## 2024-12-30 DIAGNOSIS — R60.0 LOWER LEG EDEMA: ICD-10-CM

## 2024-12-30 NOTE — TELEPHONE ENCOUNTER
Jess Gonzalez is requesting a refill on the following medication(s):  Requested Prescriptions     Pending Prescriptions Disp Refills    potassium chloride (KLOR-CON) 10 MEQ extended release tablet 60 tablet 0     Sig: Take 1 tablet by mouth daily    meloxicam (MOBIC) 15 MG tablet 90 tablet 0     Sig: Take 1 tablet by mouth daily    furosemide (LASIX) 20 MG tablet 60 tablet 0     Sig: Take 1 tablet by mouth daily       Last Visit Date (If Applicable):  12/4/2024    Next Visit Date:    Visit date not found

## 2025-01-02 RX ORDER — FUROSEMIDE 20 MG/1
20 TABLET ORAL DAILY
Qty: 60 TABLET | Refills: 0 | Status: SHIPPED | OUTPATIENT
Start: 2025-01-02

## 2025-01-02 RX ORDER — MELOXICAM 15 MG/1
15 TABLET ORAL DAILY
Qty: 90 TABLET | Refills: 0 | Status: SHIPPED | OUTPATIENT
Start: 2025-01-02

## 2025-01-02 RX ORDER — POTASSIUM CHLORIDE 750 MG/1
10 TABLET, EXTENDED RELEASE ORAL DAILY
Qty: 60 TABLET | Refills: 0 | Status: SHIPPED | OUTPATIENT
Start: 2025-01-02

## 2025-03-04 DIAGNOSIS — R60.0 LOWER LEG EDEMA: ICD-10-CM

## 2025-03-04 RX ORDER — POTASSIUM CHLORIDE 750 MG/1
10 TABLET, EXTENDED RELEASE ORAL DAILY
Qty: 60 TABLET | Refills: 0 | Status: SHIPPED | OUTPATIENT
Start: 2025-03-04

## 2025-03-04 RX ORDER — FUROSEMIDE 20 MG/1
20 TABLET ORAL DAILY
Qty: 60 TABLET | Refills: 0 | Status: SHIPPED | OUTPATIENT
Start: 2025-03-04

## 2025-03-04 NOTE — TELEPHONE ENCOUNTER
Jess Gonzalez is requesting a refill on the following medication(s):  Requested Prescriptions     Pending Prescriptions Disp Refills    potassium chloride (KLOR-CON) 10 MEQ extended release tablet [Pharmacy Med Name: POTASSIUM CL 10MEQ ER TABLETS] 60 tablet 0     Sig: TAKE 1 TABLET BY MOUTH DAILY    furosemide (LASIX) 20 MG tablet [Pharmacy Med Name: FUROSEMIDE 20MG TABLETS] 60 tablet 0     Sig: TAKE 1 TABLET BY MOUTH DAILY       Last Visit Date (If Applicable):  12/4/2024    Next Visit Date:    Visit date not found

## 2025-04-03 DIAGNOSIS — G43.C0 PERIODIC HEADACHE SYNDROME, NOT INTRACTABLE: ICD-10-CM

## 2025-04-03 RX ORDER — MELOXICAM 15 MG/1
15 TABLET ORAL DAILY
Qty: 90 TABLET | Refills: 0 | Status: SHIPPED | OUTPATIENT
Start: 2025-04-03

## 2025-04-03 RX ORDER — TOPIRAMATE 100 MG/1
50 TABLET, FILM COATED ORAL 2 TIMES DAILY
Qty: 90 TABLET | Refills: 1 | Status: SHIPPED | OUTPATIENT
Start: 2025-04-03

## 2025-04-03 NOTE — TELEPHONE ENCOUNTER
Jess Gonzalez is requesting a refill on the following medication(s):  Requested Prescriptions     Pending Prescriptions Disp Refills    topiramate (TOPAMAX) 100 MG tablet [Pharmacy Med Name: TOPIRAMATE 100MG TABLETS] 90 tablet 1     Sig: TAKE 1/2 TABLET BY MOUTH TWICE DAILY       Last Visit Date (If Applicable):  12/4/2024    Next Visit Date:    Visit date not found

## 2025-04-03 NOTE — TELEPHONE ENCOUNTER
Jess Gonzalez is requesting a refill on the following medication(s):  Requested Prescriptions     Pending Prescriptions Disp Refills    meloxicam (MOBIC) 15 MG tablet [Pharmacy Med Name: MELOXICAM 15MG TABLETS] 90 tablet 0     Sig: TAKE 1 TABLET BY MOUTH DAILY       Last Visit Date (If Applicable):  12/4/2024    Next Visit Date:    Visit date not found

## 2025-05-04 DIAGNOSIS — R60.0 LOWER LEG EDEMA: ICD-10-CM

## 2025-05-05 RX ORDER — POTASSIUM CHLORIDE 750 MG/1
10 TABLET, EXTENDED RELEASE ORAL DAILY
Qty: 60 TABLET | Refills: 0 | Status: SHIPPED | OUTPATIENT
Start: 2025-05-05

## 2025-05-05 RX ORDER — FUROSEMIDE 20 MG/1
20 TABLET ORAL DAILY
Qty: 60 TABLET | Refills: 0 | Status: SHIPPED | OUTPATIENT
Start: 2025-05-05

## 2025-05-05 NOTE — TELEPHONE ENCOUNTER
Jess Gonzalez is requesting a refill on the following medication(s):  Requested Prescriptions     Pending Prescriptions Disp Refills    furosemide (LASIX) 20 MG tablet [Pharmacy Med Name: FUROSEMIDE 20MG TABLETS] 60 tablet 0     Sig: TAKE 1 TABLET BY MOUTH DAILY    potassium chloride (KLOR-CON) 10 MEQ extended release tablet [Pharmacy Med Name: POTASSIUM CL 10MEQ ER TABLETS] 60 tablet 0     Sig: TAKE 1 TABLET BY MOUTH DAILY       Last Visit Date (If Applicable):  12/4/2024    Next Visit Date:    Visit date not found

## 2025-06-06 DIAGNOSIS — I10 PRIMARY HYPERTENSION: ICD-10-CM

## 2025-06-06 NOTE — TELEPHONE ENCOUNTER
Jess Gonzalez is requesting a refill on the following medication(s):  Requested Prescriptions     Pending Prescriptions Disp Refills    lisinopril (PRINIVIL;ZESTRIL) 10 MG tablet [Pharmacy Med Name: LISINOPRIL 10MG TABLETS] 90 tablet 1     Sig: TAKE 1 TABLET BY MOUTH DAILY       Last Visit Date (If Applicable):  12/4/2024    Next Visit Date:    Visit date not found

## 2025-06-09 RX ORDER — LISINOPRIL 10 MG/1
10 TABLET ORAL DAILY
Qty: 90 TABLET | Refills: 1 | Status: SHIPPED | OUTPATIENT
Start: 2025-06-09

## 2025-07-07 RX ORDER — MELOXICAM 15 MG/1
15 TABLET ORAL DAILY
Qty: 90 TABLET | Refills: 0 | Status: SHIPPED | OUTPATIENT
Start: 2025-07-07

## 2025-07-10 DIAGNOSIS — R60.0 LOWER LEG EDEMA: ICD-10-CM

## 2025-07-10 NOTE — TELEPHONE ENCOUNTER
Jess Gonzalez is requesting a refill on the following medication(s):  Requested Prescriptions     Pending Prescriptions Disp Refills    furosemide (LASIX) 20 MG tablet [Pharmacy Med Name: FUROSEMIDE 20MG TABLETS] 90 tablet      Sig: TAKE 1 TABLET BY MOUTH DAILY    potassium chloride (KLOR-CON) 10 MEQ extended release tablet [Pharmacy Med Name: POTASSIUM CL 10MEQ ER TABLETS] 90 tablet      Sig: TAKE 1 TABLET BY MOUTH DAILY       Last Visit Date (If Applicable):  12/4/2024    Next Visit Date:    Visit date not found

## 2025-07-14 RX ORDER — POTASSIUM CHLORIDE 750 MG/1
10 TABLET, EXTENDED RELEASE ORAL DAILY
Qty: 90 TABLET | Refills: 1 | Status: SHIPPED | OUTPATIENT
Start: 2025-07-14

## 2025-07-14 RX ORDER — FUROSEMIDE 20 MG/1
20 TABLET ORAL DAILY
Qty: 90 TABLET | Refills: 1 | Status: SHIPPED | OUTPATIENT
Start: 2025-07-14